# Patient Record
Sex: MALE | Race: ASIAN | NOT HISPANIC OR LATINO | URBAN - METROPOLITAN AREA
[De-identification: names, ages, dates, MRNs, and addresses within clinical notes are randomized per-mention and may not be internally consistent; named-entity substitution may affect disease eponyms.]

---
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
## 2022-03-14 NOTE — ED ADULT NURSE NOTE - NS ED NURSE LEVEL OF CONSCIOUSNESS MENTAL STATUS
Awake/Alert/Cooperative Cheek-To-Nose Interpolation Flap Text: A decision was made to reconstruct the defect utilizing an interpolation axial flap and a staged reconstruction.  A telfa template was made of the defect.  This telfa template was then used to outline the Cheek-To-Nose Interpolation flap.  The donor area for the pedicle flap was then injected with anesthesia.  The flap was excised through the skin and subcutaneous tissue down to the layer of the underlying musculature.  The interpolation flap was carefully excised within this deep plane to maintain its blood supply.  The edges of the donor site were undermined.   The donor site was closed in a primary fashion.  The pedicle was then rotated into position and sutured.  Once the tube was sutured into place, adequate blood supply was confirmed with blanching and refill.  The pedicle was then wrapped with xeroform gauze and dressed appropriately with a telfa and gauze bandage to ensure continued blood supply and protect the attached pedicle.

## 2024-06-24 NOTE — ED ADULT NURSE NOTE - NSFALLRSKUNASSIST_ED_ALL_ED
Caller: Juan Kaur    Relationship: Self    Best call back number: [phone]    Who are you requesting to speak with (clinical staff, provider,  specific staff member): DR. WATKINS OR MA    What was the call regarding: STATED THAT THEY HAD FORGOTTEN TO ASK ABOUT THE ANXIETY MEDICATION THAT THEY WERE GOING TO BE PUT ON. STATED THAT THEY CANNOT REMEMBER THE NAME BUT THEY WERE WANTING TO ASK SOME QUESTIONS ABOUT IT. PLEASE CALL AND ADVISE     
Can you please call patient and find out what we were talking about in regards to anxiety-what the circumstances are?  
Spoke with patient she stated she had discussed with you the marital issues she has been having along with the issues with her mother. She did not go into detail. She stated that in the past you had prescribed hydroxyzine.     She wanted to start off slow with something and is fine with whatever you recommend.   
no

## 2025-01-12 NOTE — ED ADULT TRIAGE NOTE - HEART RATE (BEATS/MIN)
Patient has a history of anxiety  On evaluation today patient reports that he has dealt with chronic anxiety for many years.   Patient's prior to admission medications included BuSpar 7.5 mg 3 times daily for anxiety, gabapentin 300 mg PRN every 8 hours for anxiety, propranolol 40 mg 3 times daily for anxiety.  These medications were continued by [facility] upon admission to  detox  Based on the record review it is unclear which of these medications have actually been the most effective for the patient.  Patient states that he has been getting these medications outpatient from an online provider.  In the patient's current setting of recent alcohol use and withdrawal, it is difficult to discern whether or not patient's anxiety related symptoms are secondary to alcohol use/withdrawal versus a primary anxiety disorder    PLAN:  Continue current regimen at this time   Recommend patient establish care with outpatient therapist and psychiatric provider for better medication management and establishment of coping skills  Referrals for outpatient psychiatric medication management and therapist were placed by [facility]   61